# Patient Record
Sex: FEMALE | Race: ASIAN | NOT HISPANIC OR LATINO | Employment: PART TIME | URBAN - METROPOLITAN AREA
[De-identification: names, ages, dates, MRNs, and addresses within clinical notes are randomized per-mention and may not be internally consistent; named-entity substitution may affect disease eponyms.]

---

## 2024-10-18 ENCOUNTER — OFFICE VISIT (OUTPATIENT)
Dept: URGENT CARE | Facility: CLINIC | Age: 19
End: 2024-10-18
Payer: COMMERCIAL

## 2024-10-18 ENCOUNTER — APPOINTMENT (OUTPATIENT)
Dept: RADIOLOGY | Facility: CLINIC | Age: 19
End: 2024-10-18
Payer: COMMERCIAL

## 2024-10-18 VITALS
BODY MASS INDEX: 24.44 KG/M2 | HEART RATE: 139 BPM | TEMPERATURE: 102.2 F | DIASTOLIC BLOOD PRESSURE: 80 MMHG | WEIGHT: 132.8 LBS | HEIGHT: 62 IN | SYSTOLIC BLOOD PRESSURE: 105 MMHG | OXYGEN SATURATION: 97 % | RESPIRATION RATE: 24 BRPM

## 2024-10-18 DIAGNOSIS — R05.1 ACUTE COUGH: ICD-10-CM

## 2024-10-18 DIAGNOSIS — R05.1 ACUTE COUGH: Primary | ICD-10-CM

## 2024-10-18 LAB
ATRIAL RATE: 109 BPM
P AXIS: 49 DEGREES
PR INTERVAL: 124 MS
QRS AXIS: 10 DEGREES
QRSD INTERVAL: 72 MS
QT INTERVAL: 316 MS
QTC INTERVAL: 425 MS
T WAVE AXIS: 24 DEGREES
VENTRICULAR RATE: 109 BPM

## 2024-10-18 PROCEDURE — 71046 X-RAY EXAM CHEST 2 VIEWS: CPT

## 2024-10-18 PROCEDURE — S9083 URGENT CARE CENTER GLOBAL: HCPCS

## 2024-10-18 PROCEDURE — G0382 LEV 3 HOSP TYPE B ED VISIT: HCPCS

## 2024-10-18 PROCEDURE — 93010 ELECTROCARDIOGRAM REPORT: CPT | Performed by: INTERNAL MEDICINE

## 2024-10-18 PROCEDURE — 93005 ELECTROCARDIOGRAM TRACING: CPT

## 2024-10-18 RX ORDER — METHYLPREDNISOLONE 4 MG/1
TABLET ORAL
Qty: 1 EACH | Refills: 0 | Status: SHIPPED | OUTPATIENT
Start: 2024-10-18

## 2024-10-18 RX ORDER — ALBUTEROL SULFATE 90 UG/1
2 INHALANT RESPIRATORY (INHALATION) EVERY 6 HOURS PRN
Qty: 8.5 G | Refills: 0 | Status: SHIPPED | OUTPATIENT
Start: 2024-10-18

## 2024-10-18 RX ORDER — DOXYCYCLINE 100 MG/1
100 CAPSULE ORAL 2 TIMES DAILY
Qty: 14 CAPSULE | Refills: 0 | Status: SHIPPED | OUTPATIENT
Start: 2024-10-18 | End: 2024-10-25

## 2024-10-18 NOTE — PROGRESS NOTES
[facility]        NAME: Mahendra Mcconnell is a 19 y.o. female  : 2005    MRN: [id number]  DATE: 2024  TIME: 4:07 PM    Assessment and Plan   Acute cough [R05.1]  1. Acute cough  XR chest pa and lateral    doxycycline monohydrate (MONODOX) 100 mg capsule    methylPREDNISolone 4 MG tablet therapy pack    albuterol (ProAir HFA) 90 mcg/act inhaler    ECG 12 lead        Preliminary reading of chest x-ray with pneumonia    Patient Instructions       Follow up with PCP in 3-5 days.  Proceed to  ER if symptoms worsen.    If tests have been performed at [facility], our office will contact you with results if changes need to be made to the care plan discussed with you at the visit.  You can review your full results on [facility].    Chief Complaint     Chief Complaint   Patient presents with    Fever    Shortness of Breath     Pt states last week she woke up feeling hot took temp and had a fever.  pt went to ER ans was tested for Covid Flu RSV and strep and all test were negative. Pt is still having fevers and shortness of breath woke up today with tightness in her chest and vomiting         History of Present Illness       19-year-old female with no significant past medical history, presents for cold-like symptoms x 7 days.  Patient admits 3 days after symptom onset she was saw her PCP.  RSV, COVID, flu, strep testing all negative.  Has been using Tylenol, DayQuil, NyQuil as needed.  Cough became productive and associated chest tightness and wheezing 2 days ago.    Fever  Associated symptoms include chills, congestion, a fever and a sore throat.   Shortness of Breath  Associated symptoms include rhinorrhea, a sore throat and wheezing.       Review of Systems   Review of Systems   Constitutional:  Positive for chills and fever.   HENT:  Positive for congestion, ear pain, postnasal drip, rhinorrhea and sore throat.    Respiratory:  Positive for chest tightness, shortness of breath and  "wheezing.          Current Medications       Current Outpatient Medications:     albuterol (ProAir HFA) 90 mcg/act inhaler, Inhale 2 puffs every 6 (six) hours as needed for wheezing, Disp: 8.5 g, Rfl: 0    doxycycline monohydrate (MONODOX) 100 mg capsule, Take 1 capsule (100 mg total) by mouth 2 (two) times a day for 7 days, Disp: 14 capsule, Rfl: 0    methylPREDNISolone 4 MG tablet therapy pack, Use as directed on package, Disp: 1 each, Rfl: 0    Current Allergies     Allergies as of 10/18/2024    (No Known Allergies)            The following portions of the patient's history were reviewed and updated as appropriate: allergies, current medications, past family history, past medical history, past social history, past surgical history and problem list.     Past Medical History:   Diagnosis Date    Anxiety        History reviewed. No pertinent surgical history.    History reviewed. No pertinent family history.      Medications have been verified.        Objective   /80 (BP Location: Right arm, Patient Position: Sitting, Cuff Size: Standard)   Pulse (!) 139   Temp (!) 102.2 °F (39 °C) (Tympanic)   Resp (!) 24   Ht 5' 2\" (1.575 m)   Wt 60.2 kg (132 lb 12.8 oz)   SpO2 97%   BMI 24.29 kg/m²   No LMP recorded.       Physical Exam     Physical Exam  Vitals and nursing note reviewed.   Constitutional:       General: She is not in acute distress.     Appearance: She is not toxic-appearing.   HENT:      Head: Normocephalic and atraumatic.      Right Ear: Tympanic membrane, ear canal and external ear normal.      Left Ear: Tympanic membrane, ear canal and external ear normal.      Ears:      Comments: B/l serous effusions     Nose: Rhinorrhea present.      Mouth/Throat:      Mouth: Mucous membranes are moist.      Pharynx: No oropharyngeal exudate or posterior oropharyngeal erythema.   Eyes:      Conjunctiva/sclera: Conjunctivae normal.   Cardiovascular:      Rate and Rhythm: Regular rhythm. Tachycardia present. "   Pulmonary:      Effort: Pulmonary effort is normal.      Breath sounds: Normal breath sounds. No wheezing.   Lymphadenopathy:      Cervical: No cervical adenopathy.   Neurological:      Mental Status: She is alert.   Psychiatric:         Mood and Affect: Mood normal.         Behavior: Behavior normal.

## 2024-10-18 NOTE — LETTER
October 18, 2024     Patient: Mahendra Mcconnell   YOB: 2005   Date of Visit: 10/18/2024       To Whom it May Concern:    Mahendra Mcconnell was seen in my clinic on 10/18/2024.  She may return to school once afebrile without the use of antipyretics  If you have any questions or concerns, please don't hesitate to call.         Sincerely,          Armond Snyder PA-C        CC: No Recipients

## 2024-10-20 NOTE — PROGRESS NOTES
[facility]        NAME: Mahendra Mcconnell is a 19 y.o. female  : 2005    MRN: [id number]  DATE: 2024  TIME: 2:03 PM    There were no vitals taken for this visit.    Assessment and Plan   No primary diagnosis found.  1. Acute cough  XR chest pa and lateral            Patient Instructions       Follow up with PCP in 3-5 days.  Proceed to  ER if symptoms worsen.    Chief Complaint   No chief complaint on file.        History of Present Illness       HPI    Review of Systems   Review of Systems      Current Medications       Current Outpatient Medications:     albuterol (ProAir HFA) 90 mcg/act inhaler, Inhale 2 puffs every 6 (six) hours as needed for wheezing, Disp: 8.5 g, Rfl: 0    doxycycline monohydrate (MONODOX) 100 mg capsule, Take 1 capsule (100 mg total) by mouth 2 (two) times a day for 7 days, Disp: 14 capsule, Rfl: 0    methylPREDNISolone 4 MG tablet therapy pack, Use as directed on package, Disp: 1 each, Rfl: 0    Current Allergies     Allergies as of 10/18/2024    (No Known Allergies)            The following portions of the patient's history were reviewed and updated as appropriate: allergies, current medications, past family history, past medical history, past social history, past surgical history and problem list.     Past Medical History:   Diagnosis Date    Anxiety        No past surgical history on file.    No family history on file.      Medications have been verified.        Objective   There were no vitals taken for this visit.       Physical Exam     Physical Exam

## 2024-10-29 ENCOUNTER — OFFICE VISIT (OUTPATIENT)
Dept: URGENT CARE | Facility: CLINIC | Age: 19
End: 2024-10-29
Payer: COMMERCIAL

## 2024-10-29 ENCOUNTER — HOSPITAL ENCOUNTER (EMERGENCY)
Facility: HOSPITAL | Age: 19
Discharge: HOME | End: 2024-10-29
Attending: EMERGENCY MEDICINE
Payer: COMMERCIAL

## 2024-10-29 VITALS
SYSTOLIC BLOOD PRESSURE: 119 MMHG | RESPIRATION RATE: 18 BRPM | HEART RATE: 84 BPM | DIASTOLIC BLOOD PRESSURE: 70 MMHG | OXYGEN SATURATION: 99 % | TEMPERATURE: 97.5 F

## 2024-10-29 VITALS
HEART RATE: 80 BPM | RESPIRATION RATE: 18 BRPM | TEMPERATURE: 97.2 F | OXYGEN SATURATION: 99 % | SYSTOLIC BLOOD PRESSURE: 112 MMHG | DIASTOLIC BLOOD PRESSURE: 63 MMHG

## 2024-10-29 DIAGNOSIS — R42 LIGHTHEADEDNESS: Primary | ICD-10-CM

## 2024-10-29 DIAGNOSIS — R42 DIZZINESS AND GIDDINESS: Primary | ICD-10-CM

## 2024-10-29 LAB
ATRIAL RATE: 79 BPM
ATRIAL RATE: 79 BPM
ATRIAL RATE: 86 BPM
EXT PREGNANCY TEST URINE: NEGATIVE
EXT. CONTROL: NORMAL
GLUCOSE SERPL-MCNC: 90 MG/DL (ref 65–140)
P AXIS: 52 DEGREES
P AXIS: 52 DEGREES
P AXIS: 53 DEGREES
PR INTERVAL: 134 MS
PR INTERVAL: 136 MS
PR INTERVAL: 136 MS
QRS AXIS: 28 DEGREES
QRS AXIS: 28 DEGREES
QRS AXIS: 60 DEGREES
QRSD INTERVAL: 78 MS
QRSD INTERVAL: 78 MS
QRSD INTERVAL: 80 MS
QT INTERVAL: 364 MS
QT INTERVAL: 368 MS
QT INTERVAL: 368 MS
QTC INTERVAL: 421 MS
QTC INTERVAL: 421 MS
QTC INTERVAL: 435 MS
T WAVE AXIS: 12 DEGREES
T WAVE AXIS: 16 DEGREES
T WAVE AXIS: 16 DEGREES
VENTRICULAR RATE: 79 BPM
VENTRICULAR RATE: 79 BPM
VENTRICULAR RATE: 86 BPM

## 2024-10-29 PROCEDURE — 99284 EMERGENCY DEPT VISIT MOD MDM: CPT

## 2024-10-29 PROCEDURE — G0382 LEV 3 HOSP TYPE B ED VISIT: HCPCS | Performed by: NURSE PRACTITIONER

## 2024-10-29 PROCEDURE — 93005 ELECTROCARDIOGRAM TRACING: CPT

## 2024-10-29 PROCEDURE — 93005 ELECTROCARDIOGRAM TRACING: CPT | Performed by: NURSE PRACTITIONER

## 2024-10-29 PROCEDURE — 81025 URINE PREGNANCY TEST: CPT

## 2024-10-29 PROCEDURE — 93010 ELECTROCARDIOGRAM REPORT: CPT | Performed by: INTERNAL MEDICINE

## 2024-10-29 PROCEDURE — 99285 EMERGENCY DEPT VISIT HI MDM: CPT | Performed by: EMERGENCY MEDICINE

## 2024-10-29 PROCEDURE — S9083 URGENT CARE CENTER GLOBAL: HCPCS | Performed by: NURSE PRACTITIONER

## 2024-10-29 PROCEDURE — 82948 REAGENT STRIP/BLOOD GLUCOSE: CPT

## 2024-10-29 RX ORDER — DIAZEPAM 2 MG/1
2 TABLET ORAL ONCE
Status: COMPLETED | OUTPATIENT
Start: 2024-10-29 | End: 2024-10-29

## 2024-10-29 RX ORDER — FLUOXETINE 10 MG/1
CAPSULE ORAL
COMMUNITY
Start: 2024-10-26

## 2024-10-29 RX ADMIN — DIAZEPAM 2 MG: 2 TABLET ORAL at 18:43

## 2024-10-29 NOTE — ED ATTENDING ATTESTATION
"I, Melvin Randhawa MD, saw and evaluated the patient. I have discussed the patient with the resident and agree with the resident's findings, Plan of Care, and MDM as documented in the resident's note, except where noted. All available labs and Radiology studies were reviewed.  I was present for key portions of any procedure(s) performed by the resident and I was immediately available to provide assistance.    At this point I agree with the current assessment done in the Emergency Department.  I have conducted an independent evaluation of this patient a history and physical is as follows:    18 yo female with a history of anxiety presents to the ED complaining of dizziness. The patient was diagnosed with a pneumonia on 10/18 and was treated with a course of oral antibiotics. She says most of her symptoms resolved but she is still occasionally experiencing intermittent lightheadedness \"like I might pass out\". No fevers, chills, cough, chest pain, or shortness of breath. She denies nausea, vomiting, numbness, and weakness. She thinks her symptoms might be related to her anxiety. No LE swelling or pain. No actual syncopal events or falls. No other specific complaints.    ROS: per resident physician note    Gen: NAD, AA&Ox3  HEENT: PERRL, EOMI, no nystagmus  Neck: supple  CV: RRR  Lungs: CTA B/L  Abdomen: soft, NT/ND  Ext: no swelling or deformity  Neuro: 5/5 strength all extremities, sensation grossly intact, steady gait, (-) Romberg  Skin: no rash    ED Course  The patient is well appearing with stable vital signs and a benign neurologic examination. Symptoms associated with the pneumonia have resolved. Anxiety vs dehydration vs hypoglycemia vs arrhythmia? Will check EKG, fingerstick glucose, and hCG. Valium administered, will continue to monitor in the ED. Disposition per workup and reassessment.      Critical Care Time  Procedures   "

## 2024-10-29 NOTE — PROGRESS NOTES
[facility]        NAME: Mahendra Mcconnell is a 19 y.o. female  : 2005    MRN: [id number]  DATE: 2024  TIME: 4:56 PM    Assessment and Plan   Dizziness and giddiness [R42]  1. Dizziness and giddiness  Transfer to other facility            Patient Instructions       Advised to go to the ED for further eval      If tests have been performed at [facility], our office will contact you with results if changes need to be made to the care plan discussed with you at the visit.  You can review your full results on [facility].    Chief Complaint     Chief Complaint   Patient presents with    Cough     Patient continues with cough , congestion, she noted dizziness. She was seen in our office on 10/18/2024 completed the medication.          History of Present Illness       HPI  Reports she was treated recently for pneumonia  on 10/18/2024 and took all her meds. Her symptoms improved but for the last day or two started having dizziness. Feels like she is going to pass-out. States she has been drinking and feeding well. Denies chest pain or chest tightness. No nausea or vomiting. Has a Hx of anxiety and states she took alprazolam, which did not help.       Review of Systems   Review of Systems   Constitutional:  Negative for fever.   Respiratory:  Negative for cough, shortness of breath and wheezing.    Cardiovascular:  Negative for chest pain and palpitations.   Gastrointestinal:  Positive for nausea (improved from a few days ago). Negative for abdominal pain, diarrhea and vomiting.   Neurological:  Positive for dizziness.         Current Medications       Current Outpatient Medications:     FLUoxetine (PROzac) 10 mg capsule, , Disp: , Rfl:     albuterol (ProAir HFA) 90 mcg/act inhaler, Inhale 2 puffs every 6 (six) hours as needed for wheezing, Disp: 8.5 g, Rfl: 0    methylPREDNISolone 4 MG tablet therapy pack, Use as directed on package, Disp: 1 each, Rfl: 0    Current Allergies     Allergies as  of 10/29/2024    (No Known Allergies)            The following portions of the patient's history were reviewed and updated as appropriate: allergies, current medications, past family history, past medical history, past social history, past surgical history and problem list.     Past Medical History:   Diagnosis Date    Anxiety        No past surgical history on file.    No family history on file.      Medications have been verified.        Objective   /63   Pulse 80   Temp (!) 97.2 °F (36.2 °C)   Resp 18   SpO2 99%   No LMP recorded.       Physical Exam     Physical Exam  Constitutional:       General: She is not in acute distress.     Appearance: She is not ill-appearing.   Cardiovascular:      Rate and Rhythm: Regular rhythm.      Heart sounds: Normal heart sounds.   Pulmonary:      Effort: Pulmonary effort is normal.      Breath sounds: Normal breath sounds.

## 2024-10-29 NOTE — Clinical Note
Mahendra Mcconnell was seen and treated in our emergency department on 10/29/2024.                Diagnosis:     Mahendra  .    She may return on this date: 11/01/2024         If you have any questions or concerns, please don't hesitate to call.      Kwesi Perez MD    ______________________________           _______________          _______________  Hospital Representative                              Date                                Time

## 2024-10-30 NOTE — ED PROVIDER NOTES
"Time reflects when diagnosis was documented in both MDM as applicable and the Disposition within this note       Time User Action Codes Description Comment    10/29/2024  6:39 PM Kwesi Perez Add [R42] Lightheadedness           ED Disposition       ED Disposition   Discharge    Condition   Stable    Date/Time   Tue Oct 29, 2024  6:39 PM    Comment   Mahendra Mcconnell discharge to home/self care.                   Assessment & Plan       Medical Decision Making  Patient is 19-year-old female presenting for lightheadedness, anxiety.  DDx: Anxiety, arrhythmia hypoglycemia  Based on patient presentation and physical exam finds compartment concerns for rule out of arrhythmia and hypoglycemia.  No plans for further workup at this time.  School note provided.  Return precautions given.  Ready for discharge.    Problems Addressed:  Lightheadedness: acute illness or injury    Amount and/or Complexity of Data Reviewed  Labs: ordered.    Risk  Prescription drug management.             Medications   diazepam (VALIUM) tablet 2 mg (2 mg Oral Given 10/29/24 1843)       ED Risk Strat Scores             CRAFFT      Flowsheet Row Most Recent Value   CRAFFT Initial Screen: During the past 12 months, did you:    1. Drink any alcohol (more than a few sips)?  No Filed at: 10/29/2024 1723   2. Smoke any marijuana or hashish No Filed at: 10/29/2024 1723   3. Use anything else to get high? (\"anything else\" includes illegal drugs, over the counter and prescription drugs, and things that you sniff or 'cole')? No Filed at: 10/29/2024 1723                                          History of Present Illness       Chief Complaint   Patient presents with    Dizziness     Pt recently treated for pneumonia and finished abx + steroids. Pt c/o ongoing dizziness. Was advised to be seen in ED by urgent care.        Past Medical History:   Diagnosis Date    Anxiety       History reviewed. No pertinent surgical history.   History reviewed. No pertinent family " history.       E-Cigarette/Vaping      E-Cigarette/Vaping Substances      I have reviewed and agree with the history as documented.     HPI  Patient is 19-year-old female presenting for concerns of of anxiety, dizziness.  Patient was recently diagnosed treated for pneumonia on 10/18, completed course of antibiotics.  Patient denies any fever chills cough congestion nausea vomiting diarrhea.  Patient does states she feels anxious/dizzy (not true dizziness, no room spinning just lightheadedness.) denies any palpitations, no symptom past medical history.  Review of Systems   Constitutional: Negative.    HENT: Negative.     Eyes: Negative.    Respiratory: Negative.     Cardiovascular: Negative.    Gastrointestinal: Negative.    Endocrine: Negative.    Genitourinary: Negative.    Musculoskeletal: Negative.    Skin: Negative.    Allergic/Immunologic: Negative.    Neurological:  Positive for dizziness and light-headedness.   Hematological: Negative.    Psychiatric/Behavioral:  The patient is nervous/anxious.    All other systems reviewed and are negative.          Objective       ED Triage Vitals [10/29/24 1723]   Temperature Pulse Blood Pressure Respirations SpO2 Patient Position - Orthostatic VS   97.5 °F (36.4 °C) 84 119/70 18 99 % --      Temp Source Heart Rate Source BP Location FiO2 (%) Pain Score    Tympanic Monitor -- -- --      Vitals      Date and Time Temp Pulse SpO2 Resp BP Pain Score FACES Pain Rating User   10/29/24 1723 97.5 °F (36.4 °C) 84 99 % 18 119/70 -- -- JR            Physical Exam  Vitals and nursing note reviewed.   Constitutional:       Appearance: Normal appearance. She is normal weight.   HENT:      Head: Normocephalic and atraumatic.      Right Ear: Tympanic membrane, ear canal and external ear normal.      Left Ear: Tympanic membrane, ear canal and external ear normal.      Nose: Nose normal.      Mouth/Throat:      Mouth: Mucous membranes are moist.      Pharynx: Oropharynx is clear.   Eyes:       Extraocular Movements: Extraocular movements intact.      Conjunctiva/sclera: Conjunctivae normal.      Pupils: Pupils are equal, round, and reactive to light.   Cardiovascular:      Rate and Rhythm: Normal rate and regular rhythm.      Pulses: Normal pulses.      Heart sounds: Normal heart sounds.   Pulmonary:      Effort: Pulmonary effort is normal.      Breath sounds: Normal breath sounds.   Abdominal:      General: Abdomen is flat. Bowel sounds are normal.      Palpations: Abdomen is soft.   Musculoskeletal:         General: Normal range of motion.      Cervical back: Normal range of motion and neck supple.   Skin:     General: Skin is warm and dry.      Capillary Refill: Capillary refill takes less than 2 seconds.   Neurological:      General: No focal deficit present.      Mental Status: She is alert and oriented to person, place, and time.      Cranial Nerves: No cranial nerve deficit.      Sensory: No sensory deficit.      Motor: No weakness.      Coordination: Coordination normal.      Gait: Gait normal.      Deep Tendon Reflexes: Reflexes normal.   Psychiatric:         Mood and Affect: Mood normal.         Behavior: Behavior normal.         Thought Content: Thought content normal.         Judgment: Judgment normal.         Results Reviewed       Procedure Component Value Units Date/Time    POCT pregnancy, urine [[id number]]  (Normal) Resulted: 10/29/24 1853    Lab Status: Final result Updated: 10/29/24 1853     EXT Preg Test, Ur Negative     Control Valid    Fingerstick Glucose (POCT) [[id number]]  (Normal) Collected: 10/29/24 1841    Lab Status: Final result Specimen: Blood Updated: 10/29/24 1842     POC Glucose 90 mg/dl             No orders to display       ECG 12 Lead Documentation Only    Date/Time: 10/29/2024 7:32 PM    Performed by: Kwesi Perez MD  Authorized by: Kwesi Perez MD    Indications / Diagnosis:  Dizziness  ECG reviewed by me, the ED Provider: yes    Patient location:   ED  Interpretation:     Interpretation: normal    Rate:     ECG rate assessment: normal    Rhythm:     Rhythm: sinus rhythm    Ectopy:     Ectopy: none    QRS:     QRS axis:  Normal    QRS intervals:  Normal  Conduction:     Conduction: normal    ST segments:     ST segments:  Normal  T waves:     T waves: normal        ED Medication and Procedure Management   Prior to Admission Medications   Prescriptions Last Dose Informant Patient Reported? Taking?   FLUoxetine (PROzac) 10 mg capsule   Yes No   albuterol (ProAir HFA) 90 mcg/act inhaler   No No   Sig: Inhale 2 puffs every 6 (six) hours as needed for wheezing   methylPREDNISolone 4 MG tablet therapy pack   No No   Sig: Use as directed on package      Facility-Administered Medications: None     Discharge Medication List as of 10/29/2024  6:41 PM        CONTINUE these medications which have NOT CHANGED    Details   albuterol (ProAir HFA) 90 mcg/act inhaler Inhale 2 puffs every 6 (six) hours as needed for wheezing, Starting Fri 10/18/2024, Normal      FLUoxetine (PROzac) 10 mg capsule Historical Med      methylPREDNISolone 4 MG tablet therapy pack Use as directed on package, Normal           No discharge procedures on file.  ED SEPSIS DOCUMENTATION   Time reflects when diagnosis was documented in both MDM as applicable and the Disposition within this note       Time User Action Codes Description Comment    10/29/2024  6:39 PM Kwesi Perez Add [R42] Lightheadedness                  Kwesi Perez MD  10/30/24 5521